# Patient Record
Sex: FEMALE | URBAN - METROPOLITAN AREA
[De-identification: names, ages, dates, MRNs, and addresses within clinical notes are randomized per-mention and may not be internally consistent; named-entity substitution may affect disease eponyms.]

---

## 2022-12-12 ENCOUNTER — EMERGENCY (EMERGENCY)
Facility: HOSPITAL | Age: 52
LOS: 0 days | Discharge: HOME | End: 2022-12-12
Attending: EMERGENCY MEDICINE | Admitting: EMERGENCY MEDICINE

## 2022-12-12 VITALS
OXYGEN SATURATION: 98 % | RESPIRATION RATE: 18 BRPM | SYSTOLIC BLOOD PRESSURE: 116 MMHG | DIASTOLIC BLOOD PRESSURE: 79 MMHG | HEIGHT: 64 IN | HEART RATE: 90 BPM | TEMPERATURE: 99 F | WEIGHT: 104.94 LBS

## 2022-12-12 DIAGNOSIS — K08.89 OTHER SPECIFIED DISORDERS OF TEETH AND SUPPORTING STRUCTURES: ICD-10-CM

## 2022-12-12 DIAGNOSIS — K02.9 DENTAL CARIES, UNSPECIFIED: ICD-10-CM

## 2022-12-12 PROCEDURE — 99283 EMERGENCY DEPT VISIT LOW MDM: CPT

## 2022-12-12 NOTE — ED ADULT NURSE NOTE - SUICIDE SCREENING QUESTION 2
Problem: Pressure Injury, Risk for  Goal: # Skin remains intact  Outcome: Outcome Met, Continue evaluating goal progress toward completion  Goal: No new pressure injury (PI) development  Outcome: Outcome Met, Continue evaluating goal progress toward completion      No

## 2022-12-12 NOTE — ED PROVIDER NOTE - OBJECTIVE STATEMENT
52 year old female with pmhx noted presents with left dental pain x 2 weeks. pain too tooth 17. no swelling or fever

## 2022-12-12 NOTE — CONSULT NOTE ADULT - ASSESSMENT
Patient is a 52y old  Female who presents with a chief complaint of     HPI: Upper left dental pain for the past week      PAST MEDICAL & SURGICAL HISTORY:      MEDICATIONS  (STANDING):    MEDICATIONS  (PRN):      Allergies    No Known Allergies    Intolerances        FAMILY HISTORY:    Vital Signs Last 24 Hrs  T(C): 37.2 (12 Dec 2022 15:00), Max: 37.2 (12 Dec 2022 15:00)  T(F): 98.9 (12 Dec 2022 15:00), Max: 98.9 (12 Dec 2022 15:00)  HR: 90 (12 Dec 2022 15:00) (90 - 90)  BP: 116/79 (12 Dec 2022 15:00) (116/79 - 116/79)  BP(mean): --  RR: 18 (12 Dec 2022 15:00) (18 - 18)  SpO2: 98% (12 Dec 2022 15:00) (98% - 98%)        LABS:                  EOE:  TMJ ( - ) clicks                     ( - ) pops                     ( - ) crepitus             Mandible <<FROM>>             Facial bones and MOM <<grossly intact>>             ( - ) trismus             ( - ) lymphadenopathy             ( - ) swelling             ( - ) asymmetry             ( - ) palpation             ( - ) dyspnea             ( - ) dysphagia             ( - ) loss of consciousness    IOE:  <<permanent>> dentition: <<multiple missing teeth>>           hard/soft palate: <<No pathology noted>>           tongue/FOM <<No pathology noted>>           labial/buccal mucosa <<No pathology noted>>           ( + ) percussion           ( - ) palpation           ( +) swelling            ( + ) abscess           ( - ) sinus tract    Dentition present: <<y>>  Mobility: <<y>>  Caries: << y>>         *DENTAL RADIOGRAPHS: Periapical #15    RADIOLOGY & ADDITIONAL STUDIES:    *ASSESSMENT: Nonrestorable tooth #15. Furcal involvement noted radiographically      *PLAN: Nonsurgical extraction #15    PROCEDURE:   Verbal and written consent given.  Anesthesia: 1 carpule of 4% Septocaine (1:100,000 epinephrine) via local infiltration  Treatment: Treatment consequences explained as per OS sheet dated 7/13/00. Risks and benefits discussed. Consent obtained and side site completed. Administered anesthetic as described. As the procedure began patient opted to not extract due to the pain. Despite offering additional anesthetic patient was adverse to treatment.    Prescribed: Amoxicillin 500 mg q8h x 7days; Ibuprofen 600 mg 1q6h PRN:Pain    RECOMMENDATIONS:  1) Complete antiobiotic course as prescribed and analgesics as necessary for pain  2) Dental F/U with outpatient dentist for comprehensive dental care.   3) If any difficulty swallowing/breathing, fever occur, return to ER.     Resident Name:Anoop Alvarado, pager #[phone]

## 2022-12-12 NOTE — ED PROVIDER NOTE - NS ED ATTENDING STATEMENT MOD
This was a shared visit with the BAIRON. I reviewed and verified the documentation and independently performed the documented:

## 2022-12-12 NOTE — ED ADULT TRIAGE NOTE - CHIEF COMPLAINT QUOTE
top left wisdom tooth pain for 2 weeks worsened the past 3 days. took tylenol extra strength. no relief.
